# Patient Record
(demographics unavailable — no encounter records)

---

## 2025-03-05 NOTE — HEALTH RISK ASSESSMENT
[Good] : ~his/her~  mood as  good [No] : In the past 12 months have you used drugs other than those required for medical reasons? No [No falls in past year] : Patient reported no falls in the past year [0] : 2) Feeling down, depressed, or hopeless: Not at all (0) [PHQ-2 Negative - No further assessment needed] : PHQ-2 Negative - No further assessment needed [Never] : Never [Patient reported mammogram was normal] : Patient reported mammogram was normal [Patient reported PAP Smear was normal] : Patient reported PAP Smear was normal [HIV test declined] : HIV test declined [Hepatitis C test declined] : Hepatitis C test declined [None] : None [With Significant Other] : lives with significant other [Employed] : employed [Significant Other] : lives with significant other [# Of Children ___] : has [unfilled] children [Sexually Active] : sexually active [Feels Safe at Home] : Feels safe at home [Neglect Or Abandonment] : neglect or abandonment [Fully functional (bathing, dressing, toileting, transferring, walking, feeding)] : Fully functional (bathing, dressing, toileting, transferring, walking, feeding) [Fully functional (using the telephone, shopping, preparing meals, housekeeping, doing laundry, using] : Fully functional and needs no help or supervision to perform IADLs (using the telephone, shopping, preparing meals, housekeeping, doing laundry, using transportation, managing medications and managing finances) [Smoke Detector] : smoke detector [Safety elements used in home] : safety elements used in home [Seat Belt] :  uses seat belt [With Patient/Caregiver] : , with patient/caregiver [Designated Healthcare Proxy] : Designated healthcare proxy [Name: ___] : Health Care Proxy's Name: [unfilled]  [Relationship: ___] : Relationship: [unfilled] [Patient reported colonoscopy was normal] : Patient reported colonoscopy was normal [de-identified] : no [de-identified] : no [[id number]] : 0 [Change in mental status noted] : No change in mental status noted [Language] : denies difficulty with language [Handling Complex Tasks] : denies difficulty handling complex tasks [Reports changes in hearing] : Reports no changes in hearing [Reports changes in vision] : Reports no changes in vision [Reports changes in dental health] : Reports no changes in dental health [TB Exposure] : is not being exposed to tuberculosis [MammogramDate] : 06/24 [MammogramComments] : with gyn [PapSmearDate] : 2024 [PapSmearComments] : Gyn: Dr Walker [ColonoscopyDate] : 01/25 [ColonoscopyComments] : ESTELLA [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

## 2025-03-05 NOTE — HISTORY OF PRESENT ILLNESS
[FreeTextEntry1] : Annual physical [de-identified] : 45 y/o HF originally from [facility] with medical hx significant for pre DM. Reports B/L shoulder pain for aprox 1 month, and dizziness, associated with stress. Left heel throbbing pain. Pt is here today for CPE.  Had 2 dose Pfizer COVID vaccine

## 2025-03-13 NOTE — REASON FOR VISIT
[Initial Visit] : an initial visit for [Plantar Fasciitis] : plantar fasciitis [FreeTextEntry2] : left heel pain.

## 2025-03-13 NOTE — HISTORY OF PRESENT ILLNESS
[FreeTextEntry1] : Presents in the [place] office for left heel pain, began about 1 month ago. States pain is mainly after work. Describes pain as throbbing. States after resting pain improves.  Pain level 6 out of 10.  Stands about 8-10 hours a day, 5-6x a week for work and ambulates sneakers. HX of b/l heel pain, treated with anti-inflammatory which resolved the pain,  about 7-8 years ago

## 2025-03-13 NOTE — ASSESSMENT
[FreeTextEntry1] : Discussed diagnosis and treatment with patient Discussed etiology of symptoms patient is experiencing Obtained/reviewed left foot xrays 3 views WB 3/13/25: significant plantar heel enthesopathy. increased calcaneal inclination angle. no fracture/dislocation Demonstrated proper stretching techniques with patient showing understanding Discussed proper RICE techniques to reduce inflammation and for pain control Discussed specific examples of over-the-counter inserts to control heel eversion and support the medial arch Discussed proper shoes (stiff heel counter and midsole with flexion at the 1st MTPJ) Rx: Celecoxib PO BID for 2 weeks, then as needed Discussed all risks, complications, and benefits of corticosteroid injection therapy.  Will have further discussion at next visit  Patient to return to the office in 1-2 months

## 2025-03-13 NOTE — PHYSICAL EXAM
[General Appearance - Alert] : alert [General Appearance - In No Acute Distress] : in no acute distress [Ankle Swelling (On Exam)] : not present [Delayed in the Left Toes] : capillary refills normal in the left toes [de-identified] : Pain on palpation at medial calcaneal tuberosity of Left.  Increased pain with the windless mechanism.  Flexible mid/hindfoot deformity noted foot passively corrected to plantigrade foot position Right Left.  Decreased range of motion in dorsiflexion Right Left. [] : no rash [Skin Lesions] : no skin lesions [Sensation] : the sensory exam was normal to light touch and pinprick [No Focal Deficits] : no focal deficits [Deep Tendon Reflexes (DTR)] : deep tendon reflexes were 2+ and symmetric [Motor Exam] : the motor exam was normal [Oriented To Time, Place, And Person] : oriented to person, place, and time [Impaired Insight] : insight and judgment were intact [Affect] : the affect was normal

## 2025-06-02 NOTE — HISTORY OF PRESENT ILLNESS
[FreeTextEntry1] : 47 year old female with PMH as listed below presents today for an initial evaluation for joint pain  - Reports pain to BL shoulders x 6 months now - Pain worse at night. Feels her shoulder is "stuck" when she turns - No hx of trauma. 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 chart reviewed

## 2025-06-02 NOTE — REASON FOR VISIT
[Initial Evaluation] : an initial evaluation [Language Line ] : provided by [employer]   [Interpreters_IDNumber] : [id number] [Interpreters_FullName] : Christiano [TWNoteComboBox1] : Syrian

## 2025-06-02 NOTE — ASSESSMENT
[FreeTextEntry1] : BL shoulder pain  - labs and imaging as below - conservative treatment of the patient's condition- including rest, ice, heat, anti-inflammatory medications, activity modifications, home stretching and strengthening exercises daily. - PT  Discussed treatment plan with the patient. The patient was given the opportunity to ask questions and all questions were answered to their satisfaction.